# Patient Record
Sex: FEMALE | URBAN - METROPOLITAN AREA
[De-identification: names, ages, dates, MRNs, and addresses within clinical notes are randomized per-mention and may not be internally consistent; named-entity substitution may affect disease eponyms.]

---
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
## 2025-05-30 PROCEDURE — 83020 HEMOGLOBIN ELECTROPHORESIS: CPT | Performed by: PREVENTIVE MEDICINE

## 2025-05-30 PROCEDURE — 99000 SPECIMEN HANDLING OFFICE-LAB: CPT | Performed by: PATHOLOGY

## 2025-06-11 ENCOUNTER — OFFICE VISIT (OUTPATIENT)
Dept: ORTHOPEDICS | Facility: CLINIC | Age: 25
End: 2025-06-11
Payer: COMMERCIAL

## 2025-06-11 DIAGNOSIS — M25.562 CHRONIC PAIN OF BOTH KNEES: Primary | ICD-10-CM

## 2025-06-11 DIAGNOSIS — G89.29 CHRONIC PAIN OF BOTH KNEES: Primary | ICD-10-CM

## 2025-06-11 DIAGNOSIS — M25.561 CHRONIC PAIN OF BOTH KNEES: Primary | ICD-10-CM

## 2025-06-11 NOTE — PROGRESS NOTES
Chief Complaint:   Bilateral knee injury and left AC joint injury    Sport:  Hockey    History of Present Illness:  Ximena is a 24-year-old  who is here today to discuss her knee and shoulder issues.  She has had bilateral PCL injuries.  Left PCL injury occurred a few years ago and the right injury occurred last season.  However she has no complaints with her knees.  She denies instability.  She denies swelling.  There is no pain.  Slight happy with her knee function.    She also had a left AC joint injury.  She has a deformity.  However she has no pain.  Her shoulder is not functionally limiting.    Physical Examination:  24-year-old female alert oriented no apparent distress.  Shoulder range of motion full and symmetrical.  Rotator cuff strength 5/5.  A deformity exists at the left AC joint consistent with a grade 3 injury.  Nontender however.  Negative crossarm.    Bilateral knee evaluation reveals no effusion.  Full range of motion.  Nontender.  Negative Lachman.  Left knee has a 2+ posterior drawer that decreases in internal rotation.  The right knee has just a 1+ posterior drawer.    Impression:  Right knee grade 1 PCL deficient knee.  Asymptomatic.    Left knee grade 2 PCL deficient knee.  Asymptomatic.    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  Asymptomatic.      Plan:  From an orthopedic standpoint she is cleared for full participation.  She well let the medical staff know if she has any issues.  We did discuss the risk of further injury or damage to the knee by participating in high-level sports.  She understands and accepts the risks    20 minutes was spent on the date of the encounter doing chart review, patient visit, and documentation .

## 2025-08-28 ENCOUNTER — OFFICE VISIT (OUTPATIENT)
Dept: ORTHOPEDICS | Facility: CLINIC | Age: 25
End: 2025-08-28
Payer: COMMERCIAL